# Patient Record
Sex: MALE | Race: OTHER | NOT HISPANIC OR LATINO | URBAN - METROPOLITAN AREA
[De-identification: names, ages, dates, MRNs, and addresses within clinical notes are randomized per-mention and may not be internally consistent; named-entity substitution may affect disease eponyms.]

---

## 2023-04-12 NOTE — ASU PATIENT PROFILE, ADULT - NS PREOP UNDERSTANDS INFO
spoke to patient to be NPO/NO solid foods, dairy, sweet, smoking after 2200 pm tonight. allow to drink water apple juice till 12mn. . Dress comfortable, leave all valuable at home, Bring ID photo, and insurance card.  escort arranged, and  address  with telephone given to patient./yes

## 2023-04-12 NOTE — ASU PATIENT PROFILE, ADULT - FALL HARM RISK - UNIVERSAL INTERVENTIONS
Bed in lowest position, wheels locked, appropriate side rails in place/Call bell, personal items and telephone in reach/Instruct patient to call for assistance before getting out of bed or chair/Non-slip footwear when patient is out of bed/[place] to call system/Physically safe environment - no spills, clutter or unnecessary equipment/Purposeful Proactive Rounding/Room/bathroom lighting operational, light cord in reach

## 2023-04-12 NOTE — ASU PATIENT PROFILE, ADULT - NSICDXPASTMEDICALHX_GEN_ALL_CORE_FT
PAST MEDICAL HISTORY:  No pertinent past medical history PAST MEDICAL HISTORY:  Pituitary adenoma

## 2023-04-12 NOTE — PRE PROCEDURE NOTE - PRE PROCEDURE EVALUATION
PRE ADMISSION NOTE  Diagnosis:   Severe deviated nasal septum, nasal obstruction, chronic sinusitis, turbinate hypertrophy  Planned Surgical Procedures:   SMR-CT guided FESS-trub reduction  Indications for surgery and specialty history    38 yo male complains of chronic severe nasal obstruction and congestion for over 8 years.  he also has anosmia, puffy eyelids, mild snoring and chronic sinusitis.  he had some type of nasal reconstruction in 2013 but still had nasal obstruction.  he is resistant to antibiotics, nasal steroid sprays and antihistamines  PE:   nose : severe deviated nasal septum, OMC polypoid, tubinate hypertrophy  CT scan: severe deviated nasal septum, OMC polypoid, chronic sinusitis, turbinate obstruction  Medical History:  rhinoplasty years ago    Medications:  vascepa, cagergoline  flonase nasal spray      Allergies  none known  No Known Allergies    Intolerances    Special Comments:    Post op first visit given date and time: friday april 14, 2023 at 8:45 am    Patient called the evening before surgery: yes    Patient given all postop instructions and medications in advance of surgery: yes    Consent in chart      Discussed all risks, benefits, complications, problems, realistic expectations, chance of recurrence, possible need for further surgery, need for patient follow-up, postop course etc discussed and fully understood.        All questions answered

## 2023-04-12 NOTE — ASU PATIENT PROFILE, ADULT - NSICDXPASTSURGICALHX_GEN_ALL_CORE_FT
PAST SURGICAL HISTORY:  No significant past surgical history PAST SURGICAL HISTORY:  H/O rhinoplasty

## 2023-04-12 NOTE — ASU PATIENT PROFILE, ADULT - HEALTHCARE INFORMATION NEEDED, PROFILE
post operative instructions. medication to pharmacy , and post surgical  fallow up appointment with MD

## 2023-04-13 ENCOUNTER — OUTPATIENT (OUTPATIENT)
Dept: OUTPATIENT SERVICES | Facility: HOSPITAL | Age: 38
LOS: 1 days | Discharge: HOME | End: 2023-04-13
Payer: COMMERCIAL

## 2023-04-13 VITALS
DIASTOLIC BLOOD PRESSURE: 63 MMHG | HEART RATE: 62 BPM | OXYGEN SATURATION: 98 % | SYSTOLIC BLOOD PRESSURE: 108 MMHG | RESPIRATION RATE: 13 BRPM

## 2023-04-13 VITALS
SYSTOLIC BLOOD PRESSURE: 113 MMHG | HEART RATE: 59 BPM | TEMPERATURE: 98 F | WEIGHT: 207.01 LBS | DIASTOLIC BLOOD PRESSURE: 75 MMHG | RESPIRATION RATE: 16 BRPM | OXYGEN SATURATION: 98 % | HEIGHT: 70 IN

## 2023-04-13 DIAGNOSIS — Z98.890 OTHER SPECIFIED POSTPROCEDURAL STATES: Chronic | ICD-10-CM

## 2023-04-13 PROCEDURE — 88305 TISSUE EXAM BY PATHOLOGIST: CPT | Mod: 26

## 2023-04-13 PROCEDURE — 88300 SURGICAL PATH GROSS: CPT | Mod: 26,59

## 2023-04-13 PROCEDURE — 88311 DECALCIFY TISSUE: CPT | Mod: 26

## 2023-04-13 DEVICE — SHEET SILICONE 5X5CMX.5MM: Type: IMPLANTABLE DEVICE | Status: FUNCTIONAL

## 2023-04-13 RX ORDER — OXYCODONE HYDROCHLORIDE 5 MG/1
5 TABLET ORAL ONCE
Refills: 0 | Status: DISCONTINUED | OUTPATIENT
Start: 2023-04-13 | End: 2023-04-13

## 2023-04-13 RX ORDER — ACETAMINOPHEN 500 MG
1000 TABLET ORAL ONCE
Refills: 0 | Status: COMPLETED | OUTPATIENT
Start: 2023-04-13 | End: 2023-04-13

## 2023-04-13 RX ORDER — HYDROMORPHONE HYDROCHLORIDE 2 MG/ML
0.5 INJECTION INTRAMUSCULAR; INTRAVENOUS; SUBCUTANEOUS
Refills: 0 | Status: DISCONTINUED | OUTPATIENT
Start: 2023-04-13 | End: 2023-04-13

## 2023-04-13 RX ORDER — CABERGOLINE 0.5 MG/1
1 TABLET ORAL
Refills: 0 | DISCHARGE

## 2023-04-13 RX ORDER — FENTANYL CITRATE 50 UG/ML
50 INJECTION INTRAVENOUS
Refills: 0 | Status: DISCONTINUED | OUTPATIENT
Start: 2023-04-13 | End: 2023-04-13

## 2023-04-13 RX ORDER — SODIUM CHLORIDE 9 MG/ML
500 INJECTION, SOLUTION INTRAVENOUS ONCE
Refills: 0 | Status: COMPLETED | OUTPATIENT
Start: 2023-04-13 | End: 2023-04-13

## 2023-04-13 RX ORDER — SODIUM CHLORIDE 9 MG/ML
500 INJECTION, SOLUTION INTRAVENOUS
Refills: 0 | Status: DISCONTINUED | OUTPATIENT
Start: 2023-04-13 | End: 2023-04-13

## 2023-04-13 RX ORDER — ICOSAPENT ETHYL 500 MG/1
2 CAPSULE, LIQUID FILLED ORAL
Refills: 0 | DISCHARGE

## 2023-04-13 RX ORDER — APREPITANT 80 MG/1
40 CAPSULE ORAL ONCE
Refills: 0 | Status: COMPLETED | OUTPATIENT
Start: 2023-04-13 | End: 2023-04-13

## 2023-04-13 RX ADMIN — SODIUM CHLORIDE 500 MILLILITER(S): 9 INJECTION, SOLUTION INTRAVENOUS at 12:45

## 2023-04-13 RX ADMIN — Medication 1000 MILLIGRAM(S): at 09:12

## 2023-04-13 RX ADMIN — APREPITANT 40 MILLIGRAM(S): 80 CAPSULE ORAL at 09:11

## 2023-04-13 RX ADMIN — FENTANYL CITRATE 50 MICROGRAM(S): 50 INJECTION INTRAVENOUS at 13:30

## 2023-04-13 RX ADMIN — FENTANYL CITRATE 50 MICROGRAM(S): 50 INJECTION INTRAVENOUS at 13:16

## 2023-04-13 NOTE — BRIEF OPERATIVE NOTE - NSICDXBRIEFPROCEDURE_GEN_ALL_CORE_FT
PROCEDURES:  Submucous resectn nasal septum 13-Apr-2023 10:04:28  Abram Pathak  FESS, adult 13-Apr-2023 10:04:33  Abram Pathak  CT guided localization 13-Apr-2023 10:04:37  Abram Pathak  Reduction, nasal turbinate, using Coblation 13-Apr-2023 10:04:46  Abram Pathak  Surgical fracture, nasal turbinate 13-Apr-2023 10:04:53  Abram Pathak

## 2023-04-13 NOTE — PRE-ANESTHESIA EVALUATION ADULT - NSANTHADDINFOFT_GEN_ALL_CORE
Pt accepts all anesthesia risks IBNLT: Dental, Pharyngeal, Laryngeal, Tracheal Trauma, Sore Throat, Hoarseness, Recurrent Laryngeal Nerve Dysfunction, Upper and Lower Extremity Paresthesias, Nausea and Vomiting, Potential exacerbation of asthma and MARTA.

## 2023-04-13 NOTE — BRIEF OPERATIVE NOTE - NSICDXBRIEFPREOP_GEN_ALL_CORE_FT
PRE-OP DIAGNOSIS:  Deviated nasal septum 13-Apr-2023 10:03:21  Abram Pathak  Chronic sinusitis 13-Apr-2023 10:03:29  Abram Pathak  Chronic headaches 13-Apr-2023 10:03:41  Abram Pathak  Nasal turbinate hypertrophy 13-Apr-2023 10:03:35  Abram Pathak

## 2023-04-13 NOTE — ASU DISCHARGE PLAN (ADULT/PEDIATRIC) - PROVIDER TOKENS
PROVIDER:[TOKEN:[[id number]],SCHEDULEDAPPT:[04/14/2023],SCHEDULEDAPPTTIME:[08:45 PM],ESTABLISHEDPATIENT:[T]]

## 2023-04-13 NOTE — ASU DISCHARGE PLAN (ADULT/PEDIATRIC) - CARE PROVIDER_API CALL
Abram Pathak)  Otolaryngology  [address] [facility] [address] [place] [address]  Phone: [phone]  Fax: [phone]  Established Patient  Scheduled Appointment: 04/14/2023 08:45 PM

## 2023-04-13 NOTE — BRIEF OPERATIVE NOTE - NSICDXBRIEFPOSTOP_GEN_ALL_CORE_FT
POST-OP DIAGNOSIS:  Nasal turbinate hypertrophy 13-Apr-2023 10:04:10  Abram Pathak  Chronic headaches 13-Apr-2023 10:04:05  Abram Pathak  Chronic sinusitis 13-Apr-2023 10:04:00  Abram Pathak  Deviated nasal septum 13-Apr-2023 10:03:49  Abram Pathak

## 2023-04-20 LAB — SURGICAL PATHOLOGY STUDY: SIGNIFICANT CHANGE UP

## 2024-01-09 NOTE — PRE-ANESTHESIA EVALUATION ADULT - NSANTHSUBSTSD_GEN_ALL_CORE
Called the patient in regard to scheduled joint injection appointments with Juani Ellington PA-C. While on the call the patient informed me that he was driving at the moment and he will review his scheduled appointments via the portal and let me know if any changes need to be made.   
No

## 2024-06-28 NOTE — PRE-OP CHECKLIST - HAND OFF
Can you get more information about the rash and the eyes, any other symptoms?  Could it be allergies.  How long has she been applying the hydrocortisone?  Thank you.  He may need to be seen.   
Per my chart message from mother:Good afternoon ,I am contacting you because Krzysztof eyes have been constantly running.They are not red but they are running green mucus.He also has rash in his groin area.We have been using hydro cortisone on it but that doesn’t seem to be working.Is there anything that can be called into the pharmacy or do I need to bring him in.    LM for mother to call [facility] regarding My Chart message.   
Holding RN to OR RN

## (undated) DEVICE — Device

## (undated) DEVICE — DRSG 2X2

## (undated) DEVICE — PACK RHINOPLASTY

## (undated) DEVICE — TUBING DIEGO DECLOG

## (undated) DEVICE — MARKING PEN W RULER

## (undated) DEVICE — NDL SPINAL 25G X 3.5" (BLUE)

## (undated) DEVICE — BRAINLAB HEADBAND DISP

## (undated) DEVICE — DRAPE MAYO STAND 23"

## (undated) DEVICE — MEDIA ESWAB W/ REGULAR FLOCKED PK/50

## (undated) DEVICE — SOL ANTI FOG

## (undated) DEVICE — BLADE SCALPEL SAFETY #15 WITH PLASTIC GREEN HANDLE

## (undated) DEVICE — DRSG TELFA 3 X 8

## (undated) DEVICE — GLV 7.5 PROTEXIS (WHITE)

## (undated) DEVICE — WARMING BLANKET LOWER ADULT

## (undated) DEVICE — SHAVER BLADE OLYMPUS DIEGO ELITE 4MM STRAIGHT

## (undated) DEVICE — DRSG FOAM STAMMBERGER SINUS

## (undated) DEVICE — SLV COMPRESSION KNEE MED

## (undated) DEVICE — DRSG TAPE MICROPORE SURGICAL TAPE .5"X10 YDS TAN

## (undated) DEVICE — ELCTR BOVIE SUCTION 8FR 6"

## (undated) DEVICE — TONGUE DEPRESSOR

## (undated) DEVICE — SUT SILK 2-0 18" FS

## (undated) DEVICE — DRSG MEROCEL STANDARD NO STRING 8CM X 2CM

## (undated) DEVICE — VAGINAL PACKING 2"

## (undated) DEVICE — DRSG GAUZE SPONGE 2X2" STERILE

## (undated) DEVICE — S&N ARTHROCARE ENT WAND REFLEX ULTRA PTR

## (undated) DEVICE — SPHERE MARKER FOR BRAIN LAB IMAGE (3 SPHERES)

## (undated) DEVICE — SUT CHROMIC 4-0 18" G-3

## (undated) DEVICE — ACCLARENT SET INFLATION DEVICE